# Patient Record
Sex: MALE | Race: WHITE | NOT HISPANIC OR LATINO | Employment: OTHER | URBAN - METROPOLITAN AREA
[De-identification: names, ages, dates, MRNs, and addresses within clinical notes are randomized per-mention and may not be internally consistent; named-entity substitution may affect disease eponyms.]

---

## 2024-02-09 DIAGNOSIS — Z71.9 HEALTH EDUCATION/COUNSELING: Primary | ICD-10-CM

## 2024-04-01 ENCOUNTER — TELEPHONE (OUTPATIENT)
Dept: PRIMARY CARE CLINIC | Facility: CLINIC | Age: 79
End: 2024-04-01

## 2024-04-01 NOTE — TELEPHONE ENCOUNTER
Pt contacted and informed of results, pt verbalized understanding and had no further concerns or questions at this time        ----- Message from Angeles Ramirez MD sent at 3/30/2024 10:58 PM CDT -----  POST-CHALLENGE:  Normal Labs:  Thank you for participating in [facility]! Your labs are within normal range. However, if you have any questions that you feel need to be addressed and would like to review your labs with a physician, please make an appointment with your primary care provider. You can make this appointment using your [facility] portal.